# Patient Record
Sex: MALE | Race: WHITE | NOT HISPANIC OR LATINO | Employment: FULL TIME | URBAN - METROPOLITAN AREA
[De-identification: names, ages, dates, MRNs, and addresses within clinical notes are randomized per-mention and may not be internally consistent; named-entity substitution may affect disease eponyms.]

---

## 2017-02-16 ENCOUNTER — OFFICE VISIT (OUTPATIENT)
Dept: URGENT CARE | Facility: PHYSICIAN GROUP | Age: 27
End: 2017-02-16
Payer: COMMERCIAL

## 2017-02-16 VITALS
WEIGHT: 260 LBS | HEIGHT: 76 IN | BODY MASS INDEX: 31.66 KG/M2 | HEART RATE: 81 BPM | SYSTOLIC BLOOD PRESSURE: 140 MMHG | TEMPERATURE: 98.5 F | OXYGEN SATURATION: 97 % | DIASTOLIC BLOOD PRESSURE: 80 MMHG

## 2017-02-16 DIAGNOSIS — S46.312A STRAIN OF LEFT TRICEPS MUSCLE, INITIAL ENCOUNTER: ICD-10-CM

## 2017-02-16 DIAGNOSIS — M77.02 MEDIAL EPICONDYLITIS OF LEFT ELBOW: ICD-10-CM

## 2017-02-16 PROCEDURE — 99202 OFFICE O/P NEW SF 15 MIN: CPT | Performed by: EMERGENCY MEDICINE

## 2017-02-16 RX ORDER — NAPROXEN 500 MG/1
500 TABLET ORAL 2 TIMES DAILY WITH MEALS
Qty: 14 TAB | Refills: 0 | Status: SHIPPED | OUTPATIENT
Start: 2017-02-16 | End: 2017-07-07

## 2017-02-16 ASSESSMENT — ENCOUNTER SYMPTOMS
FOCAL WEAKNESS: 0
FEVER: 0
WEAKNESS: 0
NECK PAIN: 0
NUMBNESS: 0
SENSORY CHANGE: 0

## 2017-02-16 NOTE — MR AVS SNAPSHOT
"        Fili Treadwell   2017 5:25 PM   Office Visit   MRN: [id number]    Department:  [facility]   Dept Phone:  [phone]    Description:  Male : 1990   Provider:  Cirilo Art M.D.           Reason for Visit     Shoulder Injury left shoulder pain x1week       Allergies as of 2017     No Known Allergies      You were diagnosed with     Medial epicondylitis of left elbow   [444900]       Strain of left triceps muscle, initial encounter   [1010203]         Vital Signs     Blood Pressure Pulse Temperature Height Weight Body Mass Index    140/80 mmHg 81 36.9 °C (98.5 °F) 1.93 m (6' 4\") 117.935 kg (260 lb) 31.66 kg/m2    Oxygen Saturation                   97%           Basic Information     Date Of Birth Sex Race Ethnicity Preferred Language    1990 Male White Non- English      Health Maintenance     Patient has no pending health maintenance at this time      Current Immunizations     No immunizations on file.      Below and/or attached are the medications your provider expects you to take. Review all of your home medications and newly ordered medications with your provider and/or pharmacist. Follow medication instructions as directed by your provider and/or pharmacist. Please keep your medication list with you and share with your provider. Update the information when medications are discontinued, doses are changed, or new medications (including over-the-counter products) are added; and carry medication information at all times in the event of emergency situations     Allergies:  No Known Allergies          Medications  Valid as of: 2017 -  7:22 PM    Generic Name Brand Name Tablet Size Instructions for use    Naproxen (Tab) NAPROSYN 500 MG Take 1 Tab by mouth 2 times a day, with meals.        .                 Medicines prescribed today were sent to:     [facility] [address] [place] [address], [place] - [id number] [facility] [address]    Phone: " [phone] Fax: [phone]    Open 24 Hours?: No      Medication refill instructions:       If your prescription bottle indicates you have medication refills left, it is not necessary to call your provider’s office. Please contact your pharmacy and they will refill your medication.    If your prescription bottle indicates you do not have any refills left, you may request refills at any time through one of the following ways: The online Limk system (except Urgent Care), by calling your provider’s office, or by asking your pharmacy to contact your provider’s office with a refill request. Medication refills are processed only during regular business hours and may not be available until the next business day. Your provider may request additional information or to have a follow-up visit with you prior to refilling your medication.   *Please Note: Medication refills are assigned a new Rx number when refilled electronically. Your pharmacy may indicate that no refills were authorized even though a new prescription for the same medication is available at the pharmacy. Please request the medicine by name with the pharmacy before contacting your provider for a refill.        Referral     A referral request has been sent to our patient care coordination department. Please allow 3-5 business days for us to process this request and contact you either by phone or mail. If you do not hear from us by the 5th business day, please call us at [phone].        Instructions    Referral provided.    Elbow Injury  Minor fractures, sprains, and bruises of the elbow will all cause swelling and pain. X-rays often show swelling around the joint but may not show a fracture line on x-rays taken right after the injury. The treatment for all these types of injuries is to reduce swelling and pain and to rest the joint until movement is painless. Repeat exam and x-rays several weeks after an elbow injury may show a minor fracture not  seen on the initial exam.  Most of the time a sling is needed for the first days or weeks after the injury. Apply ice packs to the elbow for 20-30 minutes every 2 hours for the next few days. Keep your elbow elevated above the level of your heart as much as possible until the pain and swelling are better. An elastic wrap or splint may also be used to reduce movement in addition to a sling. Call your caregiver for follow-up care within one week. Keeping the elbow immobilized for too long can hurt recovery.   SEEK MEDICAL CARE IF:   · Your pain increases, or if you develop a numb, cold, or pale forearm or hand.   · You are not improving.   · You have any other questions or concerns regarding your injury.   Document Released: 01/25/2006 Document Revised: 03/11/2013 Document Reviewed: 01/06/2010  S[employer]® Patient Information ©2013 [employer].          Urtak Access Code: [email or web address]  Expires: 3/18/2017  7:22 PM    Urtak  A secure, online tool to manage your health information     [employer]’s Urtak® is a secure, online tool that connects you to your personalized health information from the privacy of your home -- day or night - making it very easy for you to manage your healthcare. Once the activation process is completed, you can even access your medical information using the Urtak harleen, which is available for free in the Apple Harleen store or Google Play store.     Urtak provides the following levels of access (as shown below):   My Chart Features   [facility] Doctor [facility]  Specialists [facility]  Urgent  Care [facility]  Primary Care  Doctor   Email your healthcare team securely and privately 24/7 X X X    Manage appointments: schedule your next appointment; view details of past/upcoming appointments X      Request prescription refills. X      View recent personal medical records, including lab and immunizations X X X X   View health record, including health history, allergies, medications X X  X X   Read reports about your outpatient visits, procedures, consult and ER notes X X X X   See your discharge summary, which is a recap of your hospital and/or ER visit that includes your diagnosis, lab results, and care plan. X X       How to register for Mediastay:  1. Go to  [email or web address].  2. Click on the Sign Up Now box, which takes you to the New Member Sign Up page. You will need to provide the following information:  a. Enter your Mediastay Access Code exactly as it appears at the top of this page. (You will not need to use this code after you’ve completed the sign-up process. If you do not sign up before the expiration date, you must request a new code.)   b. Enter your date of birth.   c. Enter your home email address.   d. Click Submit, and follow the next screen’s instructions.  3. Create a Mediastay ID. This will be your Mediastay login ID and cannot be changed, so think of one that is secure and easy to remember.  4. Create a Mediastay password. You can change your password at any time.  5. Enter your Password Reset Question and Answer. This can be used at a later time if you forget your password.   6. Enter your e-mail address. This allows you to receive e-mail notifications when new information is available in Mediastay.  7. Click Sign Up. You can now view your health information.    For assistance activating your Mediastay account, call [phone]

## 2017-02-16 NOTE — Clinical Note
February 16, 2017       Patient: Fili Treadwell   YOB: 1990   Date of Visit: 2/16/2017         To Whom It May Concern:    It is my medical opinion that Fili Treadwell should not have left arm lifting, push/pull activity more than 10 pounds for one week.        Sincerely,          Cirilo Art M.D.  Electronically Signed

## 2017-02-17 NOTE — PROGRESS NOTES
"Subjective:      Fili Treadwell is a 27 y.o. male who presents with Shoulder Injury            Arm Injury  This is a new problem. Episode onset: 2 weeks. The problem occurs constantly. The problem has been waxing and waning. Pertinent negatives include no fever, neck pain, numbness, rash or weakness. Exacerbated by: pushing. He has tried NSAIDs for the symptoms. The treatment provided no relief.    onset associated with playing football; pushing activity. No fall or direct trauma, left elbow pain developed after activity.    Review of Systems   Constitutional: Negative for fever.   Musculoskeletal: Negative for neck pain.   Skin: Negative for rash.   Neurological: Negative for sensory change, focal weakness, weakness and numbness.     PMH:  has no past medical history on file.  MEDS:   Current outpatient prescriptions:   •  naproxen (NAPROSYN) 500 MG Tab, Take 1 Tab by mouth 2 times a day, with meals., Disp: 14 Tab, Rfl: 0  ALLERGIES: No Known Allergies  SURGHX: No past surgical history on file.  SOCHX:    FH: family history is not on file.       Objective:     /80 mmHg  Pulse 81  Temp(Src) 36.9 °C (98.5 °F)  Ht 1.93 m (6' 4\")  Wt 117.935 kg (260 lb)  BMI 31.66 kg/m2  SpO2 97%     Physical Exam   Constitutional: Vital signs are normal. He appears well-developed and well-nourished. He is cooperative. He does not appear ill. No distress.   Neck: Normal range of motion. Neck supple.   Musculoskeletal:        Left elbow: He exhibits normal range of motion, no swelling, no effusion, no deformity and no laceration. Tenderness found. Medial epicondyle and olecranon process tenderness noted. No radial head and no lateral epicondyle tenderness noted.   No extension function deficit   Lymphadenopathy:   No lymphangitis   Neurological: He is alert.   Skin: Skin is warm, dry and intact.   Psychiatric: He has a normal mood and affect.               Assessment/Plan:     1. Medial epicondylitis of left elbow  Advised " activity restriction, ice when necessary  - naproxen (NAPROSYN) 500 MG Tab; Take 1 Tab by mouth 2 times a day, with meals.  Dispense: 14 Tab; Refill: 0  - REFERRAL TO ORTHOPEDICS; may benefit from local steroid injection    2. Strain of left triceps muscle, initial encounter  - naproxen (NAPROSYN) 500 MG Tab; Take 1 Tab by mouth 2 times a day, with meals.  Dispense: 14 Tab; Refill: 0  - REFERRAL TO ORTHOPEDICS

## 2017-02-17 NOTE — PATIENT INSTRUCTIONS
Referral provided.    Elbow Injury  Minor fractures, sprains, and bruises of the elbow will all cause swelling and pain. X-rays often show swelling around the joint but may not show a fracture line on x-rays taken right after the injury. The treatment for all these types of injuries is to reduce swelling and pain and to rest the joint until movement is painless. Repeat exam and x-rays several weeks after an elbow injury may show a minor fracture not seen on the initial exam.  Most of the time a sling is needed for the first days or weeks after the injury. Apply ice packs to the elbow for 20-30 minutes every 2 hours for the next few days. Keep your elbow elevated above the level of your heart as much as possible until the pain and swelling are better. An elastic wrap or splint may also be used to reduce movement in addition to a sling. Call your caregiver for follow-up care within one week. Keeping the elbow immobilized for too long can hurt recovery.   SEEK MEDICAL CARE IF:   · Your pain increases, or if you develop a numb, cold, or pale forearm or hand.   · You are not improving.   · You have any other questions or concerns regarding your injury.   Document Released: 01/25/2006 Document Revised: 03/11/2013 Document Reviewed: 01/06/2010  Fan[employer]outs® Patient Information ©2013 [employer].

## 2017-03-08 ENCOUNTER — NON-PROVIDER VISIT (OUTPATIENT)
Dept: OCCUPATIONAL MEDICINE | Facility: CLINIC | Age: 27
End: 2017-03-08

## 2017-03-08 DIAGNOSIS — Z11.1 ENCOUNTER FOR PPD TEST: ICD-10-CM

## 2017-03-08 PROCEDURE — 86580 TB INTRADERMAL TEST: CPT | Performed by: PREVENTIVE MEDICINE

## 2017-03-08 NOTE — MR AVS SNAPSHOT
Fili Treadwell   3/8/2017 11:00 AM   Non-Provider Visit   MRN: [id number]    Department:  [facility]   Dept Phone:  [phone]    Description:  Male : 1990   Provider:  [facility] ELIZABETH VARGAS RMANNY           Reason for Visit     PPD Placement           Allergies as of 3/8/2017     No Known Allergies      You were diagnosed with     Encounter for PPD test   [[id number]]         Basic Information     Date Of Birth Sex Race Ethnicity Preferred Language    1990 Male White Non- English      Health Maintenance        Date Due Completion Dates    IMM HEP B VACCINE (1 of 3 - Primary Series) 1991 ---    IMM HEP A VACCINE (1 of 2 - Standard Series) 1992 ---    IMM HPV VACCINE (1 of 3 - Male 3 Dose Series) 2002 ---    IMM VARICELLA (CHICKENPOX) VACCINE (1 of 2 - 2 Dose Adolescent Series) 2004 ---    IMM DTaP/Tdap/Td Vaccine (1 - Tdap) 2010 ---    IMM INFLUENZA (1) 2016 ---            Current Immunizations     Tuberculin Skin Test 3/8/2017      Below and/or attached are the medications your provider expects you to take. Review all of your home medications and newly ordered medications with your provider and/or pharmacist. Follow medication instructions as directed by your provider and/or pharmacist. Please keep your medication list with you and share with your provider. Update the information when medications are discontinued, doses are changed, or new medications (including over-the-counter products) are added; and carry medication information at all times in the event of emergency situations     Allergies:  No Known Allergies          Medications  Valid as of: 2017 - 11:47 AM    Generic Name Brand Name Tablet Size Instructions for use    Naproxen (Tab) NAPROSYN 500 MG Take 1 Tab by mouth 2 times a day, with meals.        .                 Medicines prescribed today were sent to:     [facility] [address] [place] [address] [address]  [facility] [address]    Phone: [phone] Fax: [phone]    Open 24 Hours?: No      Medication refill instructions:       If your prescription bottle indicates you have medication refills left, it is not necessary to call your provider’s office. Please contact your pharmacy and they will refill your medication.    If your prescription bottle indicates you do not have any refills left, you may request refills at any time through one of the following ways: The online [employer] system (except Urgent Care), by calling your provider’s office, or by asking your pharmacy to contact your provider’s office with a refill request. Medication refills are processed only during regular business hours and may not be available until the next business day. Your provider may request additional information or to have a follow-up visit with you prior to refilling your medication.   *Please Note: Medication refills are assigned a new Rx number when refilled electronically. Your pharmacy may indicate that no refills were authorized even though a new prescription for the same medication is available at the pharmacy. Please request the medicine by name with the pharmacy before contacting your provider for a refill.           [employer] Status: Patient Declined

## 2017-03-10 ENCOUNTER — NON-PROVIDER VISIT (OUTPATIENT)
Dept: OCCUPATIONAL MEDICINE | Facility: CLINIC | Age: 27
End: 2017-03-10

## 2017-03-10 ENCOUNTER — APPOINTMENT (OUTPATIENT)
Dept: RADIOLOGY | Facility: IMAGING CENTER | Age: 27
End: 2017-03-10
Attending: PREVENTIVE MEDICINE
Payer: COMMERCIAL

## 2017-03-10 DIAGNOSIS — Z11.1 ENCOUNTER FOR PPD SKIN TEST READING: ICD-10-CM

## 2017-03-10 DIAGNOSIS — Z11.1 ENCOUNTER FOR PPD SKIN TEST READING: Primary | ICD-10-CM

## 2017-03-10 LAB — TB WHEAL 3D P 5 TU DIAM: NORMAL MM

## 2017-03-10 PROCEDURE — 71010 DX-CHEST-LIMITED (1 VIEW): CPT | Mod: TC | Performed by: PREVENTIVE MEDICINE

## 2017-03-10 NOTE — MR AVS SNAPSHOT
Fili Treadwell   3/10/2017 1:00 PM   Non-Provider Visit   MRN: [id number]    Department:  [facility]   Dept Phone:  [phone]    Description:  Male : 1990   Provider:  [facility] ELIZABETH VARGAS, RBronsonN.           Reason for Visit     Other cxr      Allergies as of 3/10/2017     No Known Allergies      You were diagnosed with     Encounter for PPD skin test reading   [[id number]]  -  Primary       Basic Information     Date Of Birth Sex Race Ethnicity Preferred Language    1990 Male White Non- English      Your appointments     Mar 10, 2017  1:45 PM   XRAY 15 with 975 ELIZABETH DX 1   [facility] - [facility]    [facility]-[address]  [place] [id number] [phone]              Health Maintenance        Date Due Completion Dates    IMM HEP B VACCINE (1 of 3 - Primary Series) 1991 ---    IMM HEP A VACCINE (1 of 2 - Standard Series) 1992 ---    IMM HPV VACCINE (1 of 3 - Male 3 Dose Series) 2002 ---    IMM VARICELLA (CHICKENPOX) VACCINE (1 of 2 - 2 Dose Adolescent Series) 2004 ---    IMM DTaP/Tdap/Td Vaccine (1 - Tdap) 2010 ---    IMM INFLUENZA (1) 2016 ---            Current Immunizations     Tuberculin Skin Test 3/8/2017      Below and/or attached are the medications your provider expects you to take. Review all of your home medications and newly ordered medications with your provider and/or pharmacist. Follow medication instructions as directed by your provider and/or pharmacist. Please keep your medication list with you and share with your provider. Update the information when medications are discontinued, doses are changed, or new medications (including over-the-counter products) are added; and carry medication information at all times in the event of emergency situations     Allergies:  No Known Allergies          Medications  Valid as of: March 10, 2017 -  1:44 PM    Generic Name Brand Name Tablet Size Instructions  for use    Naproxen (Tab) NAPROSYN 500 MG Take 1 Tab by mouth 2 times a day, with meals.        .                 Medicines prescribed today were sent to:     [facility] [address] [place] [address], [place] - [address] [facility]    [address] [facility] [place] [address]    Phone: [phone] Fax: [phone]    Open 24 Hours?: No      Medication refill instructions:       If your prescription bottle indicates you have medication refills left, it is not necessary to call your provider’s office. Please contact your pharmacy and they will refill your medication.    If your prescription bottle indicates you do not have any refills left, you may request refills at any time through one of the following ways: The online [employer] system (except Urgent Care), by calling your provider’s office, or by asking your pharmacy to contact your provider’s office with a refill request. Medication refills are processed only during regular business hours and may not be available until the next business day. Your provider may request additional information or to have a follow-up visit with you prior to refilling your medication.   *Please Note: Medication refills are assigned a new Rx number when refilled electronically. Your pharmacy may indicate that no refills were authorized even though a new prescription for the same medication is available at the pharmacy. Please request the medicine by name with the pharmacy before contacting your provider for a refill.        Your To Do List     Future Labs/Procedures Complete By [employer]-CHEST-LIMITED (1 VIEW)  As directed 3/10/2018         [employer] Status: Patient Declined

## 2017-07-07 ENCOUNTER — HOSPITAL ENCOUNTER (OUTPATIENT)
Dept: LAB | Facility: MEDICAL CENTER | Age: 27
End: 2017-07-07
Attending: FAMILY MEDICINE
Payer: COMMERCIAL

## 2017-07-07 ENCOUNTER — OFFICE VISIT (OUTPATIENT)
Dept: URGENT CARE | Facility: PHYSICIAN GROUP | Age: 27
End: 2017-07-07
Payer: COMMERCIAL

## 2017-07-07 VITALS
OXYGEN SATURATION: 98 % | HEART RATE: 79 BPM | TEMPERATURE: 98.9 F | WEIGHT: 250 LBS | BODY MASS INDEX: 30.44 KG/M2 | SYSTOLIC BLOOD PRESSURE: 118 MMHG | DIASTOLIC BLOOD PRESSURE: 82 MMHG | HEIGHT: 76 IN

## 2017-07-07 DIAGNOSIS — Z11.3 SCREENING EXAMINATION FOR STD (SEXUALLY TRANSMITTED DISEASE): ICD-10-CM

## 2017-07-07 LAB
HAV IGM SERPL QL IA: NEGATIVE
HBV CORE IGM SER QL: NEGATIVE
HBV SURFACE AG SER QL: NEGATIVE
HCV AB SER QL: NEGATIVE
HIV 1+2 AB+HIV1 P24 AG SERPL QL IA: NON REACTIVE
TREPONEMA PALLIDUM IGG+IGM AB [PRESENCE] IN SERUM OR PLASMA BY IMMUNOASSAY: NON REACTIVE

## 2017-07-07 PROCEDURE — 87491 CHLMYD TRACH DNA AMP PROBE: CPT

## 2017-07-07 PROCEDURE — 86780 TREPONEMA PALLIDUM: CPT

## 2017-07-07 PROCEDURE — 36415 COLL VENOUS BLD VENIPUNCTURE: CPT

## 2017-07-07 PROCEDURE — 80074 ACUTE HEPATITIS PANEL: CPT

## 2017-07-07 PROCEDURE — 87591 N.GONORRHOEAE DNA AMP PROB: CPT

## 2017-07-07 PROCEDURE — 99202 OFFICE O/P NEW SF 15 MIN: CPT | Performed by: FAMILY MEDICINE

## 2017-07-07 PROCEDURE — 86696 HERPES SIMPLEX TYPE 2 TEST: CPT

## 2017-07-07 PROCEDURE — 87389 HIV-1 AG W/HIV-1&-2 AB AG IA: CPT

## 2017-07-07 NOTE — MR AVS SNAPSHOT
"        Fili Treadwell   2017 1:10 PM   Office Visit   MRN: [id number]    Department:  [facility]   Dept Phone:  [phone]    Description:  Male : 1990   Provider:  Pepe Amezquita M.D.           Reason for Visit     Sexually Transmitted Diseases pt has not been exposed he just wants to be checked       Allergies as of 2017     No Known Allergies      You were diagnosed with     Screening examination for STD (sexually transmitted disease)   [[id number]]         Vital Signs     Blood Pressure Pulse Temperature Height Weight Body Mass Index    118/82 mmHg 79 37.2 °C (98.9 °F) 1.93 m (6' 4\") 113.399 kg (250 lb) 30.44 kg/m2    Oxygen Saturation                   98%           Basic Information     Date Of Birth Sex Race Ethnicity Preferred Language    1990 Male White Non- English      Health Maintenance        Date Due Completion Dates    IMM HEP B VACCINE (1 of 3 - Primary Series) 1991 ---    IMM HEP A VACCINE (1 of 2 - Standard Series) 1992 ---    IMM HPV VACCINE (1 of 3 - Male 3 Dose Series) 2002 ---    IMM VARICELLA (CHICKENPOX) VACCINE (1 of 2 - 2 Dose Adolescent Series) 2004 ---    IMM DTaP/Tdap/Td Vaccine (1 - Tdap) 2010 ---    IMM INFLUENZA (1) 2017 ---            Current Immunizations     Tuberculin Skin Test 3/8/2017      Below and/or attached are the medications your provider expects you to take. Review all of your home medications and newly ordered medications with your provider and/or pharmacist. Follow medication instructions as directed by your provider and/or pharmacist. Please keep your medication list with you and share with your provider. Update the information when medications are discontinued, doses are changed, or new medications (including over-the-counter products) are added; and carry medication information at all times in the event of emergency situations     Allergies:  No Known Allergies          Medications  Valid as of: 2017 - "  2:57 PM    Generic Name Brand Name Tablet Size Instructions for use    .                 Medicines prescribed today were sent to:     [facility] [address] [place] [address] (), [place] - [address]    [address] () [place] [address]    Phone: [phone] Fax: [phone]    Open 24 Hours?: No      Medication refill instructions:       If your prescription bottle indicates you have medication refills left, it is not necessary to call your provider’s office. Please contact your pharmacy and they will refill your medication.    If your prescription bottle indicates you do not have any refills left, you may request refills at any time through one of the following ways: The online [employer] system (except Urgent Care), by calling your provider’s office, or by asking your pharmacy to contact your provider’s office with a refill request. Medication refills are processed only during regular business hours and may not be available until the next business day. Your provider may request additional information or to have a follow-up visit with you prior to refilling your medication.   *Please Note: Medication refills are assigned a new Rx number when refilled electronically. Your pharmacy may indicate that no refills were authorized even though a new prescription for the same medication is available at the pharmacy. Please request the medicine by name with the pharmacy before contacting your provider for a refill.        Your To Do List     Future Labs/Procedures Complete By Expires    CHLAMYDIA/GC PCR URINE OR SWAB  As directed 7/28/2017    HEPATITIS PANEL ACUTE(4 COMPONENTS)  As directed 7/28/2017    HIV ANTIBODIES  As directed 7/28/2017    HSV II SPECIFIC IGG AB  As directed 7/28/2017    T.PALLIDUM AB EIA  As directed 7/28/2017         [employer] Access Code: Activation code not generated  Current [employer] Status: Active

## 2017-07-07 NOTE — PROGRESS NOTES
Chief Complaint:    Chief Complaint   Patient presents with   • Sexually Transmitted Diseases     pt has not been exposed he just wants to be checked        History of Present Illness:    This is a new problem. He is requesting routine STI testing. Denies current symptoms or recent exposure. Denies history of STI.      Review of Systems:    Constitutional: Negative for fever, chills, and diaphoresis.   Eyes: Negative for change in vision, photophobia, pain, redness, and discharge.  ENT: Negative for ear pain, ear discharge, hearing loss, tinnitus, nasal congestion, nosebleeds, and sore throat.    Respiratory: Negative for cough, hemoptysis, sputum production, shortness of breath, wheezing, and stridor.    Cardiovascular: Negative for chest pain, palpitations, orthopnea, claudication, leg swelling, and PND.   Gastrointestinal: Negative for abdominal pain, nausea, vomiting, diarrhea, constipation, blood in stool, and melena.   Genitourinary: Negative for dysuria, urinary urgency, urinary frequency, hematuria, and flank pain.   Musculoskeletal: Negative for myalgias, joint pain, neck pain, and back pain.   Skin: Negative for rash and itching.   Neurological: Negative for dizziness, tingling, tremors, sensory change, speech change, focal weakness, seizures, loss of consciousness, and headaches.   Endo: Negative for polydipsia.   Heme: Does not bruise/bleed easily.   Psychiatric/Behavioral: Negative for depression, suicidal ideas, hallucinations, memory loss and substance abuse. The patient is not nervous/anxious and does not have insomnia.      Past Medical History:    History reviewed. No pertinent past medical history.    Past Surgical History:    History reviewed. No pertinent past surgical history.    Social History:    Social History     Social History   • Marital Status: Single     Spouse Name: N/A   • Number of Children: N/A   • Years of Education: N/A     Occupational History   • Not on file.     Social History  "Main Topics   • Smoking status: Not on file   • Smokeless tobacco: Not on file   • Alcohol Use: Not on file   • Drug Use: Not on file   • Sexual Activity: Not on file     Other Topics Concern   • Not on file     Social History Narrative   • No narrative on file       Family History:    History reviewed. No pertinent family history.    Medications:    No current outpatient prescriptions on file prior to visit.     No current facility-administered medications on file prior to visit.       Allergies:    No Known Allergies      Vitals:    Filed Vitals:    07/07/17 1323   BP: 118/82   Pulse: 79   Temp: 37.2 °C (98.9 °F)   Height: 1.93 m (6' 4\")   Weight: 113.399 kg (250 lb)   SpO2: 98%       Physical Exam:    Constitutional: Vital signs reviewed. Appears well-developed and well-nourished. No acute distress.   Eyes: Sclera white, conjunctivae clear.   ENT: External ears normal. Hearing normal.   Neck: Neck supple.   Pulmonary/Chest: Respirations non-labored.   Musculoskeletal: Normal gait. Normal range of motion. No muscular atrophy or weakness.  Neurological: Alert and oriented to person, place, and time. Muscle tone normal. Coordination normal. Light touch and sensation normal.   Skin: No rashes or lesions. Warm, dry, normal turgor.  Psychiatric: Normal mood and affect. Behavior is normal. Judgment and thought content normal.       Assessment / Plan:    1. Screening examination for STD (sexually transmitted disease)  - CHLAMYDIA/GC PCR URINE OR SWAB; Future  - HIV ANTIBODIES; Future  - HEPATITIS PANEL ACUTE(4 COMPONENTS); Future  - HSV II SPECIFIC IGG AB; Future  - T.PALLIDUM AB EIA; Future      Discussed with him DDX and management options.    Agreeable to labs ordered.    Says may call [phone] with results and OK to leave message with results.  "

## 2017-07-08 LAB
C TRACH DNA SPEC QL NAA+PROBE: NEGATIVE
HSV2 GG IGG SER-ACNC: 0.07 IV
N GONORRHOEA DNA SPEC QL NAA+PROBE: NEGATIVE
SPECIMEN SOURCE: NORMAL

## 2017-07-20 ENCOUNTER — SLEEP CENTER VISIT (OUTPATIENT)
Dept: SLEEP MEDICINE | Facility: MEDICAL CENTER | Age: 27
End: 2017-07-20
Payer: COMMERCIAL

## 2017-07-20 VITALS
DIASTOLIC BLOOD PRESSURE: 64 MMHG | HEIGHT: 76 IN | SYSTOLIC BLOOD PRESSURE: 116 MMHG | WEIGHT: 250 LBS | HEART RATE: 69 BPM | OXYGEN SATURATION: 96 % | RESPIRATION RATE: 16 BRPM | BODY MASS INDEX: 30.44 KG/M2 | TEMPERATURE: 98.1 F

## 2017-07-20 DIAGNOSIS — R06.83 SNORING: ICD-10-CM

## 2017-07-20 DIAGNOSIS — G47.33 OSA (OBSTRUCTIVE SLEEP APNEA): ICD-10-CM

## 2017-07-20 DIAGNOSIS — R40.0 DAYTIME SOMNOLENCE: ICD-10-CM

## 2017-07-20 PROCEDURE — 99204 OFFICE O/P NEW MOD 45 MIN: CPT | Performed by: INTERNAL MEDICINE

## 2017-07-20 RX ORDER — OMEPRAZOLE 20 MG/1
20 CAPSULE, DELAYED RELEASE ORAL
COMMUNITY
Start: 2017-07-20 | End: 2019-09-08

## 2017-07-20 RX ORDER — ZOLPIDEM TARTRATE 5 MG/1
TABLET ORAL
Qty: 3 TAB | Refills: 0 | Status: SHIPPED | OUTPATIENT
Start: 2017-07-20 | End: 2017-08-10

## 2017-07-20 NOTE — PROGRESS NOTES
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 The patient's symptoms include loud snoring, witnessed apneas, nocturnal awakenings, prolonged sleep latency, waking up too early in the morning and being unable to return to sleep, sleepiness during the day, 2 to sleep at night, tiredness during the day, very loud and disturbing snoring, sleep talking, and frequent unusual movements in bed. Has falling asleep accidentally while watching TV, at a theater, electric, talking on the phone, talking to someone, and riding in a car or bus.    The spouse so questionnaire describes loud snoring, pauses in breathing, kicking with legs during sleep, and resuscitative snorts. His total [place] score is 15.    Uses \"preworkout\" to provide him with more energy.              Patient Active Problem List    Diagnosis Date Noted   • GRACE (obstructive sleep apnea) 07/20/2017   • Snoring 07/20/2017   • Daytime somnolence 07/20/2017       Past Medical History   Diagnosis Date   • Fatigue    • Daytime sleepiness    • Snoring    • Apnea, sleep         History reviewed. No pertinent past surgical history.    Family History   Problem Relation Age of Onset   • Sleep Apnea Neg Hx        Social History     Social History   • Marital Status: Single     Spouse Name: N/A   • Number of Children: N/A   • Years of Education: N/A     Occupational History   • Not on file.     Social History Main Topics   • Smoking status: Never Smoker    • Smokeless tobacco: Never Used   • Alcohol Use: Yes      Comment: occ   • Drug Use: No   • Sexual Activity: Not on file     Other Topics Concern   • Not on file     Social History Narrative       Current Outpatient Prescriptions   Medication Sig Dispense Refill   • omeprazole (PRILOSEC) 20 MG delayed-release capsule Take 20 mg by mouth 1 time daily as needed.     • zolpidem (AMBIEN) 5 MG Tab Take 1-2 tablets by " "mouth every evening as needed for insomnia. Bring to sleep study. 3 Tab 0     No current facility-administered medications for this visit.    \"CURRENT RX\"    ALLERGIES: Review of patient's allergies indicates no known allergies.    ROS  Constitutional: Denies fever, chills, sweats, fatigue, weight loss.   Eyes: Denies glasses, vision loss, pain, drainage, double vision.   Ears/Nose/Mouth/Throat: Denies earache, difficulty hearing, ringing/buzzing in ears, rhinitis/nasal congestion, injury, recurrent sore throat, persistent hoarseness, decayed teeth/toothaches.   Cardiovascular: Denies chest pain, tightness, palpitations, swelling in legs/feet, fainting, difficulty breathing when lying down but gets better when sitting up.   Respiratory: Denies shortness of breath, cough, sputum, wheezing, painful breathing, coughing up blood.   Sleep: Per history of present illness  Gastrointestinal: Denies difficulty swallowing, nausea, abdominal pain, diarrhea, constipation. Complains of heartburn  Genitourinary: Denies frequent urination, painful urination, blood in urine, discharge.   Musculoskeletal: Denies painful joints, sore muscles, back pain.   Integumentary: Denies rashes, lumps, color changes.   Neurological: Denies frequent headaches, dizziness, weakness    PHYSICAL EXAM    /64 mmHg  Pulse 69  Temp(Src) 36.7 °C (98.1 °F)  Resp 16  Ht 1.93 m (6' 3.98\")  Wt 113.399 kg (250 lb)  BMI 30.44 kg/m2  SpO2 96%  Appearance: Well-nourished, well-developed, no acute distress  Eyes:  PERRLA, EOMI  Hearing:  Grossly intact  Nose:  Normal, no lesions or deformities, turbinates moist  Oropharynx:  Tongue normal, posterior pharynx without erythema or exudate  Mallampati classification:  1  Neck: Supple, trachea midline, no masses  Respiratory effort:  No intercostal retractions or use of accessory muscles  Lung auscultation:  No wheezes rhonchi rubs or rales  Cardiac auscultation:  No murmurs, rubs, or gallops, no regular " rhythm, normal rate  Abdomen:  No tenderness, no organomegaly  Extremities:  No cyanosis, clubbing, edema  Gait and Station:  Normal  Digits and nails: No clubbing, cyanosis, petechiae, or nodes  Musculoskeletal:  Grossly normal  Skin:  No rashes  Orientation:  Oriented time, place, and person  Mood and affect:  No depression, anxiety, agitation  Judgment:  Intact    PROBLEMS:  1. GRACE (obstructive sleep apnea)  - zolpidem (AMBIEN) 5 MG Tab; Take 1-2 tablets by mouth every evening as needed for insomnia. Bring to sleep study.  Dispense: 3 Tab; Refill: 0  - POLYSOMNOGRAPHY, 4 OR MORE; Future  2. Snoring  3. Daytime somnolence       .    PLAN:   The patient has signs and symptoms consistent with obstructive sleep apnea hypopnea syndrome. Will schedule to have a nocturnal polysomnogram using zolpidem to assist with sleep onset and maintenance should the need arise. Will return after the results are available to determine further diagnostic needs and/or treatment options.    The risks of untreated sleep apnea were discussed with the patient at length. Patients with GRACE are at increased risk of cardiovascular disease including coronary artery disease, systemic arterial hypertension, pulmonary arterial hypertension, cardiac arrythmias, and stroke. GRACE patients have an increased risk of motor vehicle accidents, type 2 diabetes, chronic kidney disease, and non-alcoholic liver disease. The patient was advised to avoid driving a motor vehicle when drowsy.    Positive airway pressure, such as CPAP, is considered first-line and preferred therapy for sleep apnea and may reverse both symptoms and risks.     Return for after sleep study.     Patient later decided that he wanted a home study as attended study can't be done until September.

## 2017-07-20 NOTE — MR AVS SNAPSHOT
"        Fili Treadwell   2017 11:00 AM   Sleep Center Visit   MRN: [id number]    Department:  [facility]   Dept Phone:  [phone]    Description:  Male : 1990   Provider:  Wyatt Kraft M.D.           Reason for Visit     New Patient Evaluate GRACE      Allergies as of 2017     No Known Allergies      You were diagnosed with     GRACE (obstructive sleep apnea)   [050179]       Snoring   [689687]       Daytime somnolence   [006862]         Vital Signs     Blood Pressure Pulse Temperature Respirations Height Weight    116/64 mmHg 69 36.7 °C (98.1 °F) 16 1.93 m (6' 3.98\") 113.399 kg (250 lb)    Body Mass Index Oxygen Saturation Smoking Status             30.44 kg/m2 96% Never Smoker          Basic Information     Date Of Birth Sex Race Ethnicity Preferred Language    1990 Male White Non- English      Your appointments     Aug 07, 2017  2:30 PM   Sleep Study Home with SLEEP CLINIC   [facility] (--)    [address]  [place] [id number] [phone]            Aug 10, 2017  8:00 AM   Follow UP with AMAN Mohamud   [facility] (--)    [address]  [place] [id number] [phone]              Problem List              ICD-10-CM Priority Class Noted - Resolved    GRACE (obstructive sleep apnea) G47.33   2017 - Present    Snoring R06.83   2017 - Present    Daytime somnolence R40.0   2017 - Present      Health Maintenance        Date Due Completion Dates    IMM HEP B VACCINE (1 of 3 - Primary Series) 1991 ---    IMM HEP A VACCINE (1 of 2 - Standard Series) 1992 ---    IMM HPV VACCINE (1 of 3 - Male 3 Dose Series) 2002 ---    IMM VARICELLA (CHICKENPOX) VACCINE (1 of 2 - 2 Dose Adolescent Series) 2004 ---    IMM DTaP/Tdap/Td Vaccine (1 - Tdap) 2010 ---    IMM INFLUENZA (1) 2017 ---            Current Immunizations     Tuberculin Skin Test 3/8/2017      "   Below and/or attached are the medications your provider expects you to take. Review all of your home medications and newly ordered medications with your provider and/or pharmacist. Follow medication instructions as directed by your provider and/or pharmacist. Please keep your medication list with you and share with your provider. Update the information when medications are discontinued, doses are changed, or new medications (including over-the-counter products) are added; and carry medication information at all times in the event of emergency situations     Allergies:  No Known Allergies          Medications  Valid as of: July 20, 2017 - 11:27 AM    Generic Name Brand Name Tablet Size Instructions for use    Omeprazole (CAPSULE DELAYED RELEASE) PRILOSEC 20 MG Take 20 mg by mouth 1 time daily as needed.        Zolpidem Tartrate (Tab) AMBIEN 5 MG Take 1-2 tablets by mouth every evening as needed for insomnia. Bring to sleep study.        .                 Medicines prescribed today were sent to:     [facility] [address] [place] [address] (), [place] - [address]    [address] () [place] [address]    Phone: [phone] Fax: [phone]    Open 24 Hours?: No      Medication refill instructions:       If your prescription bottle indicates you have medication refills left, it is not necessary to call your provider’s office. Please contact your pharmacy and they will refill your medication.    If your prescription bottle indicates you do not have any refills left, you may request refills at any time through one of the following ways: The online [employer] CRM system (except Urgent Care), by calling your provider’s office, or by asking your pharmacy to contact your provider’s office with a refill request. Medication refills are processed only during regular business hours and may not be available until the next business day. Your provider may request additional information or to have a follow-up visit with you prior to  refilling your medication.   *Please Note: Medication refills are assigned a new Rx number when refilled electronically. Your pharmacy may indicate that no refills were authorized even though a new prescription for the same medication is available at the pharmacy. Please request the medicine by name with the pharmacy before contacting your provider for a refill.        Your To Do List     Future Labs/Procedures Complete By Expires    OVERNIGHT HOME SLEEP STUDY  As directed 7/20/2018         Frograms Access Code: Activation code not generated  Current Frograms Status: Active

## 2017-08-07 ENCOUNTER — HOME STUDY (OUTPATIENT)
Dept: SLEEP MEDICINE | Facility: MEDICAL CENTER | Age: 27
End: 2017-08-07
Payer: COMMERCIAL

## 2017-08-07 DIAGNOSIS — G47.33 OSA (OBSTRUCTIVE SLEEP APNEA): ICD-10-CM

## 2017-08-07 PROCEDURE — 95806 SLEEP STUDY UNATT&RESP EFFT: CPT | Performed by: INTERNAL MEDICINE

## 2017-08-07 NOTE — MR AVS SNAPSHOT
Fili Treadwell   2017 2:30 PM   Appointment   MRN: [id number]    Department:  [facility]   Dept Phone:  [phone]    Description:  Male : 1990   Provider:  SLEEP CLINIC           Allergies as of 2017     No Known Allergies      Vital Signs     Smoking Status                   Never Smoker            Basic Information     Date Of Birth Sex Race Ethnicity Preferred Language    1990 Male White Non- English      Your appointments     Aug 07, 2017  2:30 PM   Sleep Study Home with SLEEP CLINIC   [facility] (--)    [address]  [place] [id number] [phone]            Aug 10, 2017  8:00 AM   Follow UP with AMAN Mohamud   [facility] (--)    [address]  [place] [id number] [phone]              Problem List              ICD-10-CM Priority Class Noted - Resolved    GRACE (obstructive sleep apnea) G47.33   2017 - Present    Snoring R06.83   2017 - Present    Daytime somnolence R40.0   2017 - Present      Health Maintenance        Date Due Completion Dates    IMM HEP B VACCINE (1 of 3 - Primary Series) 1991 ---    IMM HEP A VACCINE (1 of 2 - Standard Series) 1992 ---    IMM HPV VACCINE (1 of 3 - Male 3 Dose Series) 2002 ---    IMM VARICELLA (CHICKENPOX) VACCINE (1 of 2 - 2 Dose Adolescent Series) 2004 ---    IMM DTaP/Tdap/Td Vaccine (1 - Tdap) 2010 ---    IMM INFLUENZA (1) 2017 ---            Current Immunizations     Tuberculin Skin Test 3/8/2017      Below and/or attached are the medications your provider expects you to take. Review all of your home medications and newly ordered medications with your provider and/or pharmacist. Follow medication instructions as directed by your provider and/or pharmacist. Please keep your medication list with you and share with your provider. Update the information when medications are discontinued,  doses are changed, or new medications (including over-the-counter products) are added; and carry medication information at all times in the event of emergency situations     Allergies:  No Known Allergies          Medications  Valid as of: August 07, 2017 -  2:27 PM    Generic Name Brand Name Tablet Size Instructions for use    Omeprazole (CAPSULE DELAYED RELEASE) PRILOSEC 20 MG Take 20 mg by mouth 1 time daily as needed.        Zolpidem Tartrate (Tab) AMBIEN 5 MG Take 1-2 tablets by mouth every evening as needed for insomnia. Bring to sleep study.        .                 Medicines prescribed today were sent to:     [facility] [address] [place] [address] (), [place] - [address]    [address] () [place] [address]    Phone: [phone] Fax: [phone]    Open 24 Hours?: No      Medication refill instructions:       If your prescription bottle indicates you have medication refills left, it is not necessary to call your provider’s office. Please contact your pharmacy and they will refill your medication.    If your prescription bottle indicates you do not have any refills left, you may request refills at any time through one of the following ways: The online ticckle system (except Urgent Care), by calling your provider’s office, or by asking your pharmacy to contact your provider’s office with a refill request. Medication refills are processed only during regular business hours and may not be available until the next business day. Your provider may request additional information or to have a follow-up visit with you prior to refilling your medication.   *Please Note: Medication refills are assigned a new Rx number when refilled electronically. Your pharmacy may indicate that no refills were authorized even though a new prescription for the same medication is available at the pharmacy. Please request the medicine by name with the pharmacy before contacting your provider for a refill.           [employer]  Code: Activation code not generated  Current MyChart Status: Active

## 2017-08-08 NOTE — PROCEDURES
Interpretation:    This home sleep study was completed on 8/7/2017. The recording duration was 8 hours and 18 minutes, the flow evaluation duration was 5 hours and 45 minutes, and the oxygen saturation evaluation was 7 hours and 59 minutes.    No significant sleep disordered breathing was found. The AHI was 1.9. The oxygen desaturation index 7.4. The baseline oxygen saturation was 95% and the low saturation was 73%. The patient spent 23 minutes with saturations less than or equal to 88%. The heart rate varied between 40 bpm and 146 bpm with an average of 54 bpm. The patient slept primarily in the prone and supine positions and mild snoring was noted throughout the study.    Recommendation:    There is no indication for a positive airway pressure titration. Clinical correlation is needed. The patient may be a candidate for supplemental nocturnal oxygen therapy.

## 2017-08-10 ENCOUNTER — SLEEP CENTER VISIT (OUTPATIENT)
Dept: SLEEP MEDICINE | Facility: MEDICAL CENTER | Age: 27
End: 2017-08-10
Payer: COMMERCIAL

## 2017-08-10 VITALS
HEIGHT: 76 IN | DIASTOLIC BLOOD PRESSURE: 76 MMHG | WEIGHT: 259 LBS | OXYGEN SATURATION: 96 % | BODY MASS INDEX: 31.54 KG/M2 | RESPIRATION RATE: 16 BRPM | SYSTOLIC BLOOD PRESSURE: 126 MMHG | HEART RATE: 64 BPM | TEMPERATURE: 97.5 F

## 2017-08-10 DIAGNOSIS — G47.33 OSA (OBSTRUCTIVE SLEEP APNEA): ICD-10-CM

## 2017-08-10 DIAGNOSIS — R40.0 DAYTIME SOMNOLENCE: ICD-10-CM

## 2017-08-10 DIAGNOSIS — R06.83 SNORING: ICD-10-CM

## 2017-08-10 PROCEDURE — 99213 OFFICE O/P EST LOW 20 MIN: CPT | Performed by: NURSE PRACTITIONER

## 2017-08-10 ASSESSMENT — PAIN SCALES - GENERAL: PAINLEVEL: NO PAIN

## 2017-08-10 NOTE — PROGRESS NOTES
CC:  Here for f/u sleep issues as listed below    HPI:   Fili presents today for follow up obstructive sleep apnea.  HST from 8/2017 indicated an AHI of 1.8 and low oxygen of 73%. His HR ranged between 46-146bpm.   Patient is currently sleeping 4 hours per night with 2x nighttime awakenings. They have trouble falling asleep, taking appx 4 hours. Lays down at 10p and falls asleep at 3am, been this way for almost 2 years. Rarely takes Nyquil to help sleep. They do not feel refreshed in the morning and denies morning H/A. They don't feel tired throughout the day, but takes amino pre-workout to help compensate for low energy. Works outs in the morning or evening. Naps 4-5x per day for appx 1 min long.  Patient reports snoring, apnea events and paroxysmal nocturnal dyspnea events. They have fallen asleep in conversation, at the wheel, or at work. Falls alseep easily at stop light. HE reports falling asleep very easily is genetic. They deny sleepwalking/talking.      Reviewed results with patient and highly recommended in lab study for further evaluation of continued high suspicion of GRACE. He reports he did not sleep well the night of the study and wife said he did not snore at all, although he snores constantly other nights. He is quite symptomatic and at this time [employer] will not allow him to drive due to his symptoms. He is amendable to in lab study.     Patient Active Problem List    Diagnosis Date Noted   • GRACE (obstructive sleep apnea) 07/20/2017   • Snoring 07/20/2017   • Daytime somnolence 07/20/2017       Past Medical History   Diagnosis Date   • Fatigue    • Daytime sleepiness    • Snoring    • Apnea, sleep        History reviewed. No pertinent past surgical history.    Family History   Problem Relation Age of Onset   • Sleep Apnea Neg Hx        Social History     Social History   • Marital Status: Single     Spouse Name: N/A   • Number of Children: N/A   • Years of Education: N/A     Occupational History  "  • Not on file.     Social History Main Topics   • Smoking status: Never Smoker    • Smokeless tobacco: Never Used   • Alcohol Use: No   • Drug Use: No   • Sexual Activity: Not on file     Other Topics Concern   • Not on file     Social History Narrative       Current Outpatient Prescriptions   Medication Sig Dispense Refill   • omeprazole (PRILOSEC) 20 MG delayed-release capsule Take 20 mg by mouth 1 time daily as needed.       No current facility-administered medications for this visit.          Allergies: Review of patient's allergies indicates no known allergies.      ROS   Gen: Denies fever, chills, unintentional weight loss, fatigue  Resp:Denies Dyspnea  CV: Denies chest pain, chest tightness  Sleep:Denies morning headache,   Neuro: Denies frequent headaches, weakness, dizziness  See HPI.  All other systems reviewed and negative        Vital signs for this encounter:  Filed Vitals:    08/10/17 0803   Height: 1.93 m (6' 3.98\")   Weight: 117.482 kg (259 lb)   Weight % change since last entry.: 0 %   BP: 126/76   Pulse: 64   BMI (Calculated): 31.54   Resp: 16   Temp: 36.4 °C (97.5 °F)   O2 sat % room air: 96 %                   Physical Exam:   Gen:         Alert and oriented, No apparent distress.   Neck:        No Lymphadenopathy.  Lungs:     Clear to auscultation bilaterally.    CV:          Regular rate and rhythm. No murmurs, rubs or gallops.   Abd:         Soft non tender, non distended.            Ext:          No clubbing, cyanosis, edema.    Assessment   1. GRACE (obstructive sleep apnea)  POLYSOMNOGRAPHY, 4 OR MORE   2. Daytime somnolence     3. Snoring         PLAN:   Patient Instructions   1) In lab study   2) Ambien already provided  3) Return in about 2 months (around 10/10/2017) for sleep study results, review of symptoms, if not sooner, follow up with VIKTORIYA Spicer.          "

## 2017-08-10 NOTE — MR AVS SNAPSHOT
"        Fili Treadwell   8/10/2017 8:00 AM   Sleep Center Visit   MRN: [id number]    Department:  [facility]   Dept Phone:  [phone]    Description:  Male : 1990   Provider:  AMAN Mohamud           Reason for Visit     Apnea last seen 17     Results HST       Allergies as of 8/10/2017     No Known Allergies      You were diagnosed with     GRACE (obstructive sleep apnea)   [[id number]]         Vital Signs     Blood Pressure Pulse Temperature Respirations Height Weight    126/76 mmHg 64 36.4 °C (97.5 °F) 16 1.93 m (6' 3.98\") 117.482 kg (259 lb)    Body Mass Index Oxygen Saturation Smoking Status             31.54 kg/m2 96% Never Smoker          Basic Information     Date Of Birth Sex Race Ethnicity Preferred Language    1990 Male White Non- English      Your appointments     Oct 08, 2017  7:10 PM   Sleep Study Diagnostic with SLEEP TECH   [facility] (--)    [address] [id number] [phone]            Oct 18, 2017  8:20 AM   Follow UP with AMAN Mohamud   [facility] (--)    [address] [id number] [phone]              Problem List              ICD-10-CM Priority Class Noted - Resolved    GRACE (obstructive sleep apnea) G47.33   2017 - Present    Snoring R06.83   2017 - Present    Daytime somnolence R40.0   2017 - Present      Health Maintenance        Date Due Completion Dates    IMM HEP B VACCINE (1 of 3 - Primary Series) 1991 ---    IMM HEP A VACCINE (1 of 2 - Standard Series) 1992 ---    IMM HPV VACCINE (1 of 3 - Male 3 Dose Series) 2002 ---    IMM VARICELLA (CHICKENPOX) VACCINE (1 of 2 - 2 Dose Adolescent Series) 2004 ---    IMM DTaP/Tdap/Td Vaccine (1 - Tdap) 2010 ---    IMM INFLUENZA (1) 2017 ---            Current Immunizations     Tuberculin Skin Test 3/8/2017      Below and/or attached are the " medications your provider expects you to take. Review all of your home medications and newly ordered medications with your provider and/or pharmacist. Follow medication instructions as directed by your provider and/or pharmacist. Please keep your medication list with you and share with your provider. Update the information when medications are discontinued, doses are changed, or new medications (including over-the-counter products) are added; and carry medication information at all times in the event of emergency situations     Allergies:  No Known Allergies          Medications  Valid as of: August 10, 2017 -  8:24 AM    Generic Name Brand Name Tablet Size Instructions for use    Omeprazole (CAPSULE DELAYED RELEASE) PRILOSEC 20 MG Take 20 mg by mouth 1 time daily as needed.        .                 Medicines prescribed today were sent to:     [facility] [address] [place] [address] (), [place] - [address]    [address] () [place] [address]    Phone: [phone] Fax: [phone]    Open 24 Hours?: No      Medication refill instructions:       If your prescription bottle indicates you have medication refills left, it is not necessary to call your provider’s office. Please contact your pharmacy and they will refill your medication.    If your prescription bottle indicates you do not have any refills left, you may request refills at any time through one of the following ways: The online copygram system (except Urgent Care), by calling your provider’s office, or by asking your pharmacy to contact your provider’s office with a refill request. Medication refills are processed only during regular business hours and may not be available until the next business day. Your provider may request additional information or to have a follow-up visit with you prior to refilling your medication.   *Please Note: Medication refills are assigned a new Rx number when refilled electronically. Your pharmacy may indicate that no  refills were authorized even though a new prescription for the same medication is available at the pharmacy. Please request the medicine by name with the pharmacy before contacting your provider for a refill.        Your To Do List     Future Labs/Procedures Complete By Expires    POLYSOMNOGRAPHY, 4 OR MORE  As directed 8/10/2018      Instructions    1) In lab study   2) Ambien already provided  3) Return in about 2 months (around 10/10/2017) for sleep study results, review of symptoms, if not sooner, follow up with VIKTORIYA Spicer.              [employer] Access Code: Activation code not generated  Current 4vets Status: Active

## 2017-08-10 NOTE — PATIENT INSTRUCTIONS
1) In lab study   2) Ambien already provided  3) Return in about 2 months (around 10/10/2017) for sleep study results, review of symptoms, if not sooner, follow up with VIKTORIYA Spicer.

## 2017-10-17 ENCOUNTER — TELEPHONE (OUTPATIENT)
Dept: SLEEP MEDICINE | Facility: MEDICAL CENTER | Age: 27
End: 2017-10-17

## 2017-10-17 NOTE — TELEPHONE ENCOUNTER
Patient has OV with VIKTORIYA Spicer for Sleep Study results. Patient canceled SS 10/8/17. I spoke to the patient he stated he canceled and would like OV canceled as well. I offered to reschedule patient declined.

## 2017-10-18 ENCOUNTER — APPOINTMENT (OUTPATIENT)
Dept: SLEEP MEDICINE | Facility: MEDICAL CENTER | Age: 27
End: 2017-10-18
Payer: COMMERCIAL

## 2018-02-01 ENCOUNTER — OFFICE VISIT (OUTPATIENT)
Dept: URGENT CARE | Facility: PHYSICIAN GROUP | Age: 28
End: 2018-02-01
Payer: COMMERCIAL

## 2018-02-01 ENCOUNTER — HOSPITAL ENCOUNTER (OUTPATIENT)
Dept: RADIOLOGY | Facility: MEDICAL CENTER | Age: 28
End: 2018-02-01
Attending: PHYSICIAN ASSISTANT
Payer: COMMERCIAL

## 2018-02-01 VITALS
WEIGHT: 250 LBS | BODY MASS INDEX: 30.44 KG/M2 | TEMPERATURE: 98.4 F | DIASTOLIC BLOOD PRESSURE: 76 MMHG | SYSTOLIC BLOOD PRESSURE: 114 MMHG | HEART RATE: 73 BPM | OXYGEN SATURATION: 97 % | HEIGHT: 76 IN

## 2018-02-01 DIAGNOSIS — R07.81 RIB PAIN ON RIGHT SIDE: ICD-10-CM

## 2018-02-01 PROCEDURE — 71101 X-RAY EXAM UNILAT RIBS/CHEST: CPT | Mod: RT

## 2018-02-01 PROCEDURE — 99214 OFFICE O/P EST MOD 30 MIN: CPT | Performed by: PHYSICIAN ASSISTANT

## 2018-02-01 RX ORDER — CYCLOBENZAPRINE HCL 10 MG
10 TABLET ORAL 3 TIMES DAILY PRN
Qty: 30 TAB | Refills: 0 | Status: SHIPPED | OUTPATIENT
Start: 2018-02-01

## 2018-02-01 ASSESSMENT — ENCOUNTER SYMPTOMS
COUGH: 0
FEVER: 0
CHILLS: 0
DIZZINESS: 0
NAUSEA: 0
DIARRHEA: 0
SHORTNESS OF BREATH: 0
ABDOMINAL PAIN: 0
MUSCULOSKELETAL NEGATIVE: 1
VOMITING: 0
NUMBNESS: 0

## 2018-02-01 ASSESSMENT — PAIN SCALES - GENERAL: PAINLEVEL: 8=MODERATE-SEVERE PAIN

## 2018-02-01 NOTE — LETTER
February 1, 2018         Patient: Fili Treadwell   YOB: 1990   Date of Visit: 2/1/2018           To Whom it May Concern:    Fili Treadwell was seen in my clinic on 2/1/2018. Please excuse his absence today, 2/1/18.    If you have any questions or concerns, please don't hesitate to call.        Sincerely,           Sally Rodriguez P.A.-C.  Electronically Signed

## 2018-02-02 NOTE — PROGRESS NOTES
"Subjective:      Fili Treadwell is a 28 y.o. male who presents with Rib Pain (RIB PAIN 8/10/ Pt was working out x2 days ago. Noticed pain after)            Rib Injury   This is a new problem. The current episode started yesterday. The problem occurs constantly. The problem has been unchanged. Pertinent negatives include no abdominal pain, chest pain, chills, congestion, coughing, fever, nausea, numbness, rash or vomiting. The symptoms are aggravated by coughing (deep inspiration). He has tried nothing for the symptoms.     Patient presents to urgent care reporting right rib pain that started yesterday and has been constant. The pain is worsened with deep inspiration and certain movements. It is improved by sitting still. He denies any traumatic injuries to the area or recent falls. He believes it may have started as a result of pushing heavy boxes while at the gym 2 days ago. He denies any chest pain, SOB, lower extremity swelling, recent surgeries, prolonged periods of immobilization, or history of blood clots. He has no known medical problems and doesn't take any regular medications. He has not tried taking any OTC medications for the pain.     Review of Systems   Constitutional: Negative for chills and fever.   HENT: Negative for congestion.    Respiratory: Negative for cough and shortness of breath.    Cardiovascular: Negative for chest pain.   Gastrointestinal: Negative for abdominal pain, diarrhea, nausea and vomiting.   Genitourinary: Negative.    Musculoskeletal: Negative.         Positive for right rib pain   Skin: Negative for rash.   Neurological: Negative for dizziness and numbness.      Objective:     /76   Pulse 73   Temp 36.9 °C (98.4 °F)   Ht 1.93 m (6' 4\")   Wt 113.4 kg (250 lb)   SpO2 97%   BMI 30.43 kg/m²      Physical Exam   Constitutional: He is oriented to person, place, and time. He appears well-developed and well-nourished. No distress.   HENT:   Head: Normocephalic and " atraumatic.   Eyes: Conjunctivae are normal. Pupils are equal, round, and reactive to light. Right eye exhibits no discharge. Left eye exhibits no discharge.   Neck: Normal range of motion.   Cardiovascular: Normal rate, regular rhythm and normal heart sounds.    No murmur heard.  Pulmonary/Chest: Effort normal and breath sounds normal. No respiratory distress. He has no wheezes. He has no rales.       Right side pain with deep inspiration. No ecchymosis, rashes or erythema noted over right rib area. Slight TTP with deep palpation.   Musculoskeletal: Normal range of motion.   No lower extremity edema bilaterally   Neurological: He is alert and oriented to person, place, and time.   Skin: Skin is warm and dry. He is not diaphoretic.   Psychiatric: He has a normal mood and affect. His behavior is normal.   Nursing note and vitals reviewed.       PMH:  has a past medical history of Apnea, sleep; Daytime sleepiness; Fatigue; and Snoring.  MEDS:   Current Outpatient Prescriptions:   •  cyclobenzaprine (FLEXERIL) 10 MG Tab, Take 1 Tab by mouth 3 times a day as needed., Disp: 30 Tab, Rfl: 0  •  omeprazole (PRILOSEC) 20 MG delayed-release capsule, Take 20 mg by mouth 1 time daily as needed., Disp: , Rfl:   ALLERGIES: No Known Allergies  SURGHX: History reviewed. No pertinent surgical history.  SOCHX:  reports that he has never smoked. He has never used smokeless tobacco. He reports that he does not drink alcohol or use drugs.  FH: family history is not on file.       Assessment/Plan:     1. Rib pain on right side  - [id number] (WITH 1-VIEW CXR) RIGHT; Future  Impression       1.  Unremarkable right rib series.     - cyclobenzaprine (FLEXERIL) 10 MG Tab; Take 1 Tab by mouth 3 times a day as needed.  Dispense: 30 Tab; Refill: 0   - Will cause sedation, avoid driving, operating heavy machinery, and drinking alcohol    Encouraged icing/heating the area. Flexeril and OTC naiads as needed for pain. Call or return to  office if symptoms persist or worsen. The patient demonstrated a good understanding and agreed with the treatment plan.

## 2018-06-14 ENCOUNTER — HOSPITAL ENCOUNTER (OUTPATIENT)
Facility: MEDICAL CENTER | Age: 28
End: 2018-06-14
Payer: COMMERCIAL

## 2018-06-14 LAB
BDY FAT % MEASURED: 20 %
BP DIAS: 100 MMHG
BP SYS: 130 MMHG
DIABETES HTDIA: NO
EVENT NAME HTEVT: NORMAL
HYPERTENSION HTHYP: NO
SCREENING LOC CITY HTCIT: NORMAL
SCREENING LOC STATE HTSTA: NORMAL
SCREENING LOCATION HTLOC: NORMAL
SUBSCRIBER ID HTSID: NORMAL

## 2018-06-15 LAB
CHOLEST SERPL-MCNC: 145 MG/DL (ref 100–199)
GLUCOSE SERPL-MCNC: 104 MG/DL (ref 65–99)
HDLC SERPL-MCNC: 42 MG/DL
LDLC SERPL CALC-MCNC: 88 MG/DL
TRIGL SERPL-MCNC: 77 MG/DL (ref 0–149)

## 2019-01-11 ENCOUNTER — OFFICE VISIT (OUTPATIENT)
Dept: URGENT CARE | Facility: CLINIC | Age: 29
End: 2019-01-11

## 2019-01-11 VITALS
OXYGEN SATURATION: 98 % | RESPIRATION RATE: 14 BRPM | WEIGHT: 277 LBS | DIASTOLIC BLOOD PRESSURE: 76 MMHG | BODY MASS INDEX: 33.73 KG/M2 | HEART RATE: 82 BPM | TEMPERATURE: 98.7 F | HEIGHT: 76 IN | SYSTOLIC BLOOD PRESSURE: 124 MMHG

## 2019-01-11 DIAGNOSIS — Z02.89 ENCOUNTER FOR OCCUPATIONAL HEALTH ASSESSMENT: ICD-10-CM

## 2019-01-11 PROCEDURE — 92553 AUDIOMETRY AIR & BONE: CPT | Performed by: FAMILY MEDICINE

## 2019-01-11 PROCEDURE — 8915 PR COMPREHENSIVE PHYSICAL: Performed by: FAMILY MEDICINE

## 2019-04-10 ENCOUNTER — HOSPITAL ENCOUNTER (OUTPATIENT)
Dept: RADIOLOGY | Facility: MEDICAL CENTER | Age: 29
End: 2019-04-10
Attending: PHYSICIAN ASSISTANT

## 2019-04-10 DIAGNOSIS — M25.572 LEFT ANKLE PAIN, UNSPECIFIED CHRONICITY: ICD-10-CM

## 2019-04-10 PROCEDURE — 73630 X-RAY EXAM OF FOOT: CPT | Mod: LT

## 2019-09-08 ENCOUNTER — APPOINTMENT (OUTPATIENT)
Dept: RADIOLOGY | Facility: IMAGING CENTER | Age: 29
End: 2019-09-08
Attending: PHYSICIAN ASSISTANT
Payer: OTHER MISCELLANEOUS

## 2019-09-08 ENCOUNTER — OFFICE VISIT (OUTPATIENT)
Dept: URGENT CARE | Facility: PHYSICIAN GROUP | Age: 29
End: 2019-09-08
Payer: OTHER MISCELLANEOUS

## 2019-09-08 VITALS
RESPIRATION RATE: 16 BRPM | HEIGHT: 76 IN | WEIGHT: 290 LBS | DIASTOLIC BLOOD PRESSURE: 80 MMHG | TEMPERATURE: 98.6 F | BODY MASS INDEX: 35.31 KG/M2 | OXYGEN SATURATION: 96 % | SYSTOLIC BLOOD PRESSURE: 122 MMHG | HEART RATE: 74 BPM

## 2019-09-08 DIAGNOSIS — S29.011A STRAIN OF RIGHT PECTORALIS MUSCLE, INITIAL ENCOUNTER: ICD-10-CM

## 2019-09-08 DIAGNOSIS — Z76.0 MEDICATION REFILL: ICD-10-CM

## 2019-09-08 DIAGNOSIS — S49.91XA INJURY OF RIGHT SHOULDER, INITIAL ENCOUNTER: ICD-10-CM

## 2019-09-08 PROCEDURE — 99214 OFFICE O/P EST MOD 30 MIN: CPT | Performed by: PHYSICIAN ASSISTANT

## 2019-09-08 PROCEDURE — 73030 X-RAY EXAM OF SHOULDER: CPT | Mod: TC,RT | Performed by: PHYSICIAN ASSISTANT

## 2019-09-08 RX ORDER — OMEPRAZOLE 20 MG/1
20 CAPSULE, DELAYED RELEASE ORAL DAILY
Qty: 30 CAP | Refills: 0 | Status: SHIPPED | OUTPATIENT
Start: 2019-09-08

## 2019-09-08 ASSESSMENT — ENCOUNTER SYMPTOMS
SORE THROAT: 0
HEADACHES: 0
EYE REDNESS: 0
EYE DISCHARGE: 0
FEVER: 0
VOMITING: 0
NAUSEA: 0
SHORTNESS OF BREATH: 0
COUGH: 0

## 2019-09-08 NOTE — PROGRESS NOTES
Subjective:      Fili Treadwell is a 29 y.o. male who presents with Shoulder Injury (Rt shoulder, felt shoulder pop, still in pain X march )        Shoulder Injury    The incident occurred at the gym. The right shoulder is affected. Incident onset: x 7 months ago. Injury mechanism: while bench pressing. The quality of the pain is described as aching. The pain does not radiate. The pain is mild. Pertinent negatives include no chest pain, muscle weakness, numbness or tingling. He has tried nothing for the symptoms.     The patient presents to clinic with concern of a possible pectoral tendon rupture. The patient states he dislocated his right shoulder in March while bench pressing. The patient states his  popped his shoulder back into place. The patient reports continued pain to his right shoulder, specifically the anterior aspect of the shoulder extending to the pectoral muscle. The patient reports increased pain with push ups. He denies radiation of pain. No numbness, tingling, or weakness. The patient hasn't taken anything for his symptoms.     The patient is also requesting a refill of his Omeprazole.     PMH:  has a past medical history of Apnea, sleep, Daytime sleepiness, Fatigue, and Snoring.  MEDS:   Current Outpatient Medications:   •  omeprazole (PRILOSEC) 20 MG delayed-release capsule, Take 20 mg by mouth 1 time daily as needed., Disp: , Rfl:   •  cyclobenzaprine (FLEXERIL) 10 MG Tab, Take 1 Tab by mouth 3 times a day as needed. (Patient not taking: Reported on 1/11/2019), Disp: 30 Tab, Rfl: 0  ALLERGIES: No Known Allergies  SURGHX: No past surgical history on file.  SOCHX:  reports that he has never smoked. He has never used smokeless tobacco. He reports that he does not drink alcohol or use drugs.  FH: Family history was reviewed, no pertinent findings to report    Review of Systems   Constitutional: Negative for fever.   HENT: Negative for congestion, ear pain and sore throat.    Eyes: Negative  "for discharge and redness.   Respiratory: Negative for cough and shortness of breath.    Cardiovascular: Negative for chest pain and leg swelling.   Gastrointestinal: Negative for abdominal pain, nausea and vomiting.   Musculoskeletal: Positive for joint pain.   Skin: Negative for rash.   Neurological: Negative for tingling, numbness and headaches.          Objective:     /80   Pulse 74   Temp 37 °C (98.6 °F)   Resp 16   Ht 1.93 m (6' 4\")   Wt (!) 131.5 kg (290 lb)   SpO2 96%   BMI 35.30 kg/m²      Physical Exam   Constitutional: He is oriented to person, place, and time. He appears well-developed and well-nourished. No distress.   HENT:   Head: Normocephalic and atraumatic.   Right Ear: External ear normal.   Left Ear: External ear normal.   Nose: Nose normal.   Eyes: Conjunctivae and EOM are normal.   Neck: Normal range of motion. Neck supple.   Cardiovascular: Normal rate.   Pulmonary/Chest: Effort normal.   Musculoskeletal:   Right Shoulder:  Tenderness to the right anterior shoulder. No ecchymosis. No swelling.   ROM intact - the patient demonstrates full active ROM   Neurovascular intact  Strength 5/5 - equal bilateral upper extremities   Neurological: He is alert and oriented to person, place, and time.   Skin: Skin is warm and dry.          Progress:  Right Shoulder XR:  COMPARISON: None    FINDINGS:  Bone mineralization is normal.  There is no evidence of fracture or dislocation.  There is no evidence of arthropathy.  Soft tissues are normal.      Impression       No evidence of acute fracture or dislocation.        Assessment/Plan:     1. Injury of right shoulder, initial encounter  - DX-SHOULDER 2+ RIGHT; Future  - REFERRAL TO ORTHOPEDICS    2. Strain of right pectoralis muscle, initial encounter  - REFERRAL TO ORTHOPEDICS    3. Medication refill  - omeprazole (PRILOSEC) 20 MG delayed-release capsule; Take 1 Cap by mouth every day.  Dispense: 30 Cap; Refill: 0    Differential diagnoses, " supportive care, and indications for immediate follow-up discussed with patient.   Instructed to return to clinic or nearest emergency department for any change in condition, further concerns, or worsening of symptoms.    OTC NSAIDs for pain/discomfort   RICE  Referral to Orthopedics  Follow-up with PCP as needed  Return to clinic or go to the ED if symptoms worsen or fail to improve, or if the patient should develop worsening/increasing/persistent shoulder pain, shoulder instability, radiation of pain, numbness, tingling, or weakness to his right upper extremity, decreased range of motion, fever/chills, and or any concerning symptoms.    Discussed plan with the patient, and he agrees to the above.

## 2019-09-13 ASSESSMENT — ENCOUNTER SYMPTOMS
MUSCLE WEAKNESS: 0
TINGLING: 0
NUMBNESS: 0
ABDOMINAL PAIN: 0